# Patient Record
Sex: FEMALE | Race: ASIAN | NOT HISPANIC OR LATINO | URBAN - METROPOLITAN AREA
[De-identification: names, ages, dates, MRNs, and addresses within clinical notes are randomized per-mention and may not be internally consistent; named-entity substitution may affect disease eponyms.]

---

## 2020-09-08 ENCOUNTER — LAB OUTSIDE AN ENCOUNTER (OUTPATIENT)
Dept: URBAN - METROPOLITAN AREA CLINIC 118 | Facility: CLINIC | Age: 65
End: 2020-09-08

## 2020-09-08 ENCOUNTER — OFFICE VISIT (OUTPATIENT)
Dept: URBAN - METROPOLITAN AREA CLINIC 118 | Facility: CLINIC | Age: 65
End: 2020-09-08
Payer: COMMERCIAL

## 2020-09-08 DIAGNOSIS — R13.10 DYSPHAGIA: ICD-10-CM

## 2020-09-08 DIAGNOSIS — R14.2 BELCHING: ICD-10-CM

## 2020-09-08 DIAGNOSIS — R07.0 THROAT PAIN: ICD-10-CM

## 2020-09-08 DIAGNOSIS — K21.9 GERD WITHOUT ESOPHAGITIS: ICD-10-CM

## 2020-09-08 PROCEDURE — 3017F COLORECTAL CA SCREEN DOC REV: CPT | Performed by: INTERNAL MEDICINE

## 2020-09-08 PROCEDURE — G8427 DOCREV CUR MEDS BY ELIG CLIN: HCPCS | Performed by: INTERNAL MEDICINE

## 2020-09-08 PROCEDURE — G9903 PT SCRN TBCO ID AS NON USER: HCPCS | Performed by: INTERNAL MEDICINE

## 2020-09-08 PROCEDURE — G8420 CALC BMI NORM PARAMETERS: HCPCS | Performed by: INTERNAL MEDICINE

## 2020-09-08 PROCEDURE — 99213 OFFICE O/P EST LOW 20 MIN: CPT | Performed by: INTERNAL MEDICINE

## 2020-09-08 RX ORDER — SUCRALFATE 1 G/1
TAKE ONE TABLET PO TID, BREAK APART THE TABLET AND DISSOLVE IN A GLASS OF WATER PRIOR AND DRINK. NO REFILLS TABLET ORAL 2
Qty: 180 | Refills: 0 | Status: DISCONTINUED | COMMUNITY
Start: 2019-12-23

## 2020-09-08 RX ORDER — SUCRALFATE 1 G
1 TABLET ON AN EMPTY STOMACH TABLET ORAL TWICE A DAY
Qty: 60 | OUTPATIENT
Start: 2020-09-08 | End: 2020-10-08

## 2020-09-08 RX ORDER — ATORVASTATIN CALCIUM 20 MG/1
1 TABLET TABLET, FILM COATED ORAL ONCE A DAY
Status: ACTIVE | COMMUNITY

## 2020-09-08 RX ORDER — OMEPRAZOLE 40 MG/1
TAKE 1 CAPSULE BY ORAL ROUTE DAILY CAPSULE, DELAYED RELEASE PELLETS ORAL 1
Qty: 90 | Refills: 3 | Status: ACTIVE | COMMUNITY
Start: 2019-12-23

## 2020-09-08 RX ORDER — LOSARTAN POTASSIUM 25 MG/1
1 TABLET TABLET ORAL ONCE A DAY
Status: ACTIVE | COMMUNITY

## 2020-09-08 NOTE — HPI-TODAY'S VISIT:
Patient here for follow up. Patient is an established patient of Dr. Suarez.  Previously had EGD in 12/2019 and 02/2020 for dysphagia. Initial EGD showed retained pill in the distal esophagus, removed and ulcer (path showed pill esophagitis). Repeat EGD showed no esophageal pathology and had empiric dilation with Savary.  Today, she continues to have throat and neck pain along with increased gas/belching and heartburn. She is on omeprazole with partly relief. No nausea/vomiting. She was prescribed carafate in our system but unclear if she start it.  History obtained with family translating.

## 2020-10-05 ENCOUNTER — OFFICE VISIT (OUTPATIENT)
Dept: URBAN - METROPOLITAN AREA MEDICAL CENTER 28 | Facility: MEDICAL CENTER | Age: 65
End: 2020-10-05

## 2020-10-15 ENCOUNTER — OFFICE VISIT (OUTPATIENT)
Dept: URBAN - METROPOLITAN AREA CLINIC 118 | Facility: CLINIC | Age: 65
End: 2020-10-15
Payer: COMMERCIAL

## 2020-10-15 ENCOUNTER — DASHBOARD ENCOUNTERS (OUTPATIENT)
Age: 65
End: 2020-10-15

## 2020-10-15 DIAGNOSIS — K63.5 COLON POLYPS: ICD-10-CM

## 2020-10-15 DIAGNOSIS — R14.2 BELCHING: ICD-10-CM

## 2020-10-15 DIAGNOSIS — R13.10 DYSPHAGIA: ICD-10-CM

## 2020-10-15 DIAGNOSIS — R07.0 THROAT PAIN: ICD-10-CM

## 2020-10-15 DIAGNOSIS — K21.9 GERD WITHOUT ESOPHAGITIS: ICD-10-CM

## 2020-10-15 PROCEDURE — 1036F TOBACCO NON-USER: CPT | Performed by: INTERNAL MEDICINE

## 2020-10-15 PROCEDURE — G8427 DOCREV CUR MEDS BY ELIG CLIN: HCPCS | Performed by: INTERNAL MEDICINE

## 2020-10-15 PROCEDURE — 3017F COLORECTAL CA SCREEN DOC REV: CPT | Performed by: INTERNAL MEDICINE

## 2020-10-15 PROCEDURE — G8482 FLU IMMUNIZE ORDER/ADMIN: HCPCS | Performed by: INTERNAL MEDICINE

## 2020-10-15 PROCEDURE — G8420 CALC BMI NORM PARAMETERS: HCPCS | Performed by: INTERNAL MEDICINE

## 2020-10-15 PROCEDURE — 99213 OFFICE O/P EST LOW 20 MIN: CPT | Performed by: INTERNAL MEDICINE

## 2020-10-15 RX ORDER — OMEPRAZOLE 40 MG/1
TAKE 1 CAPSULE BY ORAL ROUTE DAILY CAPSULE, DELAYED RELEASE PELLETS ORAL 1
Qty: 90 | Refills: 3
Start: 2019-12-23

## 2020-10-15 RX ORDER — AMITRIPTYLINE HYDROCHLORIDE 25 MG/1
1 TAB QHS; IF NOT BETTER AND NO SIDE EFFECTS INCREASE TO 2 TABS QHS TABLET, FILM COATED ORAL ONCE A DAY
Qty: 60 | Refills: 1 | OUTPATIENT
Start: 2020-10-15

## 2020-10-15 RX ORDER — OMEPRAZOLE 40 MG/1
TAKE 1 CAPSULE BY ORAL ROUTE DAILY CAPSULE, DELAYED RELEASE PELLETS ORAL 1
Qty: 90 | Refills: 3 | Status: ACTIVE | COMMUNITY
Start: 2019-12-23

## 2020-10-15 RX ORDER — ATORVASTATIN CALCIUM 20 MG/1
1 TABLET TABLET, FILM COATED ORAL ONCE A DAY
Status: ACTIVE | COMMUNITY

## 2020-10-15 RX ORDER — LOSARTAN POTASSIUM 25 MG/1
1 TABLET TABLET ORAL ONCE A DAY
Status: ACTIVE | COMMUNITY

## 2020-10-15 NOTE — HPI-TODAY'S VISIT:
Patient here for follow up. Patient is an established patient of Dr. Suarez.  Previously had EGD in 12/2019 and 02/2020 for dysphagia. Initial EGD showed retained pill in the distal esophagus, removed and ulcer (path showed pill esophagitis). Repeat EGD showed no esophageal pathology and had empiric dilation with Savary.  Today, she continues to have throat and neck pain along with increased gas/belching and heartburn. She ran out of  omeprazole  No nausea/vomiting.  History obtained with family translating.

## 2020-11-06 ENCOUNTER — ERX REFILL RESPONSE (OUTPATIENT)
Dept: URBAN - METROPOLITAN AREA CLINIC 118 | Facility: CLINIC | Age: 65
End: 2020-11-06

## 2020-11-06 RX ORDER — SUCRALFATE 1 G/1
1 TABLET ON AN EMPTY STOMACH TABLET ORAL TWICE A DAY
Qty: 60 | Refills: 0

## 2020-12-07 ENCOUNTER — ERX REFILL RESPONSE (OUTPATIENT)
Dept: URBAN - METROPOLITAN AREA CLINIC 118 | Facility: CLINIC | Age: 65
End: 2020-12-07

## 2020-12-07 RX ORDER — SUCRALFATE 1 G/1
1 TABLET ON AN EMPTY STOMACH TABLET ORAL TWICE A DAY
Qty: 60 | Refills: 0

## 2020-12-13 ENCOUNTER — ERX REFILL RESPONSE (OUTPATIENT)
Dept: URBAN - METROPOLITAN AREA CLINIC 13 | Facility: CLINIC | Age: 65
End: 2020-12-13

## 2020-12-13 RX ORDER — AMITRIPTYLINE HYDROCHLORIDE 25 MG/1
TAKE ONE TABLET BY MOUTH EVERY NIGHT AT BEDTIME . IF NOT BETTER AND NO SIDE EFFECTS INCREAE DOSE TO 2 TABLETS BY MOUTH AT BEDTIME ONCE DAILY TABLET, FILM COATED ORAL
Qty: 60 | Refills: 0

## 2021-01-07 ENCOUNTER — ERX REFILL RESPONSE (OUTPATIENT)
Dept: URBAN - METROPOLITAN AREA CLINIC 118 | Facility: CLINIC | Age: 66
End: 2021-01-07

## 2021-01-07 RX ORDER — SUCRALFATE 1 G/1
1 TABLET ON AN EMPTY STOMACH TABLET ORAL TWICE A DAY
Qty: 60 | Refills: 0

## 2021-01-14 ENCOUNTER — ERX REFILL RESPONSE (OUTPATIENT)
Age: 66
End: 2021-01-14

## 2021-01-14 RX ORDER — AMITRIPTYLINE HYDROCHLORIDE 25 MG/1
TAKE ONE TABLET BY MOUTH EVERY NIGHT AT BEDTIME . IF NOT BETTER AND NO SIDE EFFECTS INCREAE DOSE TO 2 TABLETS BY MOUTH AT BEDTIME ONCE DAILY TABLET, FILM COATED ORAL
Qty: 60 | Refills: 0